# Patient Record
Sex: FEMALE | Race: WHITE | Employment: OTHER | URBAN - METROPOLITAN AREA
[De-identification: names, ages, dates, MRNs, and addresses within clinical notes are randomized per-mention and may not be internally consistent; named-entity substitution may affect disease eponyms.]

---

## 2023-04-17 ENCOUNTER — OFFICE VISIT (OUTPATIENT)
Dept: OBGYN | Age: 73
End: 2023-04-17
Payer: MEDICARE

## 2023-04-17 VITALS
SYSTOLIC BLOOD PRESSURE: 181 MMHG | WEIGHT: 116 LBS | BODY MASS INDEX: 19.81 KG/M2 | DIASTOLIC BLOOD PRESSURE: 65 MMHG | HEIGHT: 64 IN

## 2023-04-17 DIAGNOSIS — Z78.0 ENCOUNTER FOR OSTEOPOROSIS SCREENING IN ASYMPTOMATIC POSTMENOPAUSAL PATIENT: ICD-10-CM

## 2023-04-17 DIAGNOSIS — N89.8 VAGINAL ODOR: ICD-10-CM

## 2023-04-17 DIAGNOSIS — N74 FEMALE PELVIC INFLAMMATORY DISORDERS IN DISEASES CLASSIFIED ELSEWHERE: ICD-10-CM

## 2023-04-17 DIAGNOSIS — Z13.820 ENCOUNTER FOR OSTEOPOROSIS SCREENING IN ASYMPTOMATIC POSTMENOPAUSAL PATIENT: ICD-10-CM

## 2023-04-17 DIAGNOSIS — Z12.31 SCREENING MAMMOGRAM FOR BREAST CANCER: ICD-10-CM

## 2023-04-17 DIAGNOSIS — Z01.419 ENCOUNTER FOR ANNUAL ROUTINE GYNECOLOGICAL EXAMINATION: Primary | ICD-10-CM

## 2023-04-17 PROCEDURE — G8427 DOCREV CUR MEDS BY ELIG CLIN: HCPCS | Performed by: OBSTETRICS & GYNECOLOGY

## 2023-04-17 PROCEDURE — G0101 CA SCREEN;PELVIC/BREAST EXAM: HCPCS | Performed by: OBSTETRICS & GYNECOLOGY

## 2023-04-17 PROCEDURE — G8420 CALC BMI NORM PARAMETERS: HCPCS | Performed by: OBSTETRICS & GYNECOLOGY

## 2023-04-17 SDOH — ECONOMIC STABILITY: FOOD INSECURITY: WITHIN THE PAST 12 MONTHS, THE FOOD YOU BOUGHT JUST DIDN'T LAST AND YOU DIDN'T HAVE MONEY TO GET MORE.: NEVER TRUE

## 2023-04-17 SDOH — ECONOMIC STABILITY: FOOD INSECURITY: WITHIN THE PAST 12 MONTHS, YOU WORRIED THAT YOUR FOOD WOULD RUN OUT BEFORE YOU GOT MONEY TO BUY MORE.: NEVER TRUE

## 2023-04-17 SDOH — ECONOMIC STABILITY: HOUSING INSECURITY
IN THE LAST 12 MONTHS, WAS THERE A TIME WHEN YOU DID NOT HAVE A STEADY PLACE TO SLEEP OR SLEPT IN A SHELTER (INCLUDING NOW)?: NO

## 2023-04-17 SDOH — ECONOMIC STABILITY: INCOME INSECURITY: HOW HARD IS IT FOR YOU TO PAY FOR THE VERY BASICS LIKE FOOD, HOUSING, MEDICAL CARE, AND HEATING?: NOT HARD AT ALL

## 2023-04-17 ASSESSMENT — PATIENT HEALTH QUESTIONNAIRE - PHQ9
SUM OF ALL RESPONSES TO PHQ QUESTIONS 1-9: 0
1. LITTLE INTEREST OR PLEASURE IN DOING THINGS: 0
SUM OF ALL RESPONSES TO PHQ9 QUESTIONS 1 & 2: 0
2. FEELING DOWN, DEPRESSED OR HOPELESS: 0
SUM OF ALL RESPONSES TO PHQ QUESTIONS 1-9: 0

## 2023-04-17 ASSESSMENT — ENCOUNTER SYMPTOMS
EYES NEGATIVE: 1
RESPIRATORY NEGATIVE: 1
GASTROINTESTINAL NEGATIVE: 1
ALLERGIC/IMMUNOLOGIC NEGATIVE: 1

## 2023-04-17 NOTE — PROGRESS NOTES
4/17/23    Guanakito Rosales  1950    Chief Complaint   Patient presents with    Annual Exam     Pt here for annual exam. Postmenopausal. Total Hysterectomy. Last pap unsure. Pt not currently sexually active. Last mammo 2017- Birads 2. Last dexa 2014-normal. Pt c/o having a vaginal odor that she noticed end of November. Pt states she has been on a lot of antibiotics for her knee replacement and thinks she has a yeast infection. The patient is a 75 y.o. female, No obstetric history on file. who presents for her annual exam. She is menopausal.  She is not taking HRT. Lisa Hanna She reports no gynecological symptoms. She has had a hysterectomy with removal of ovaries. She is not sexually active. Pap smear history: Her last PAP smear was in ?? .  Her results were normal.    Breast history: her most recent mammogram was in 2017. The results were: Normal    Osteoporosis Status: her bone density scan was in 2014. The results were normal bone density    Colonoscopy Status: She has not had a colonoscopy in the past.    Past Medical History:   Diagnosis Date    Acid reflux     Allergic rhinitis     Arthritis     Endometriosis     Hyperlipidemia     Hypertension     Irregular heartbeat     Migraines     Mitral valve prolapse     Osteoarthritis        Past Surgical History:   Procedure Laterality Date    APPENDECTOMY      CARPAL TUNNEL RELEASE      COLONOSCOPY  11/16/2015    internal grade 1 hemorrhoids    CYST REMOVAL      CYST REMOVAL Right     hip    FOOT SURGERY      FOOT SURGERY Right     HYSTERECTOMY (CERVIX STATUS UNKNOWN)      JOINT REPLACEMENT Left 11/2022    NASAL SEPTUM SURGERY      TONSILLECTOMY      TUBAL LIGATION      VARICOSE VEIN SURGERY         Family History   Problem Relation Age of Onset    Cancer Father 80        colon cancer        Social History     Tobacco Use    Smoking status: Never    Smokeless tobacco: Never   Substance Use Topics    Alcohol use: Yes     Comment: occas    Drug use:  No

## 2023-04-18 LAB
CANDIDA DNA VAG QL NAA+PROBE: NORMAL
G VAGINALIS DNA SPEC QL NAA+PROBE: NORMAL
T VAGINALIS DNA VAG QL NAA+PROBE: NORMAL

## 2023-04-19 ENCOUNTER — TELEPHONE (OUTPATIENT)
Dept: GASTROENTEROLOGY | Age: 73
End: 2023-04-19

## 2023-04-19 NOTE — TELEPHONE ENCOUNTER
Called pt. In regards to a referral for a colon screening. Procedure scheduled for 5/30/23 juan Vann.

## 2023-05-26 ENCOUNTER — ANESTHESIA EVENT (OUTPATIENT)
Dept: OPERATING ROOM | Age: 73
End: 2023-05-26
Payer: MEDICARE

## 2023-05-29 NOTE — H&P
Original H &P in soft chart. I have examined the patient immediately before the procedure and there is no change in the previous history and physical exam, which has been reviewed. There is no history of sleep apnea, snoring, or stridor. There has been no  previous adverse experience with sedation/anesthesia. There is no increased risk for aspiration of gastric contents. The patient has been instructed that all resuscitative measures (during the operative and immediate perioperative period) will be instituted in the unlikely event that they will be needed. The patient has no pertinent past surgical or family history other than listed in the original H&P. The patient was counseled about the risks of anurag Covid-19 during their perioperative period and any recovery window from their procedure. The patient was made aware that anurag Covid-19  may worsen their prognosis for recovering from their procedure  and lend to a higher morbidity and/or mortality risk. All material risks, benefits, and reasonable alternatives including postponing the procedure were discussed. The patient does wish to proceed with the procedure at this time.     ASA Class: 2  AIRWAY Class: 1

## 2023-05-30 ENCOUNTER — HOSPITAL ENCOUNTER (OUTPATIENT)
Age: 73
Setting detail: OUTPATIENT SURGERY
Discharge: HOME | End: 2023-05-30
Attending: SPECIALIST | Admitting: SPECIALIST
Payer: MEDICARE

## 2023-05-30 ENCOUNTER — ANESTHESIA (OUTPATIENT)
Dept: OPERATING ROOM | Age: 73
End: 2023-05-30
Payer: MEDICARE

## 2023-05-30 VITALS
OXYGEN SATURATION: 95 % | BODY MASS INDEX: 20.8 KG/M2 | HEART RATE: 61 BPM | DIASTOLIC BLOOD PRESSURE: 57 MMHG | SYSTOLIC BLOOD PRESSURE: 163 MMHG | WEIGHT: 113 LBS | RESPIRATION RATE: 16 BRPM | HEIGHT: 62 IN | TEMPERATURE: 97.4 F

## 2023-05-30 PROBLEM — Z12.11 COLON CANCER SCREENING: Status: ACTIVE | Noted: 2023-05-30

## 2023-05-30 PROCEDURE — 2709999900 HC NON-CHARGEABLE SUPPLY: Performed by: SPECIALIST

## 2023-05-30 PROCEDURE — 7100000011 HC PHASE II RECOVERY - ADDTL 15 MIN: Performed by: SPECIALIST

## 2023-05-30 PROCEDURE — 3700000001 HC ADD 15 MINUTES (ANESTHESIA): Performed by: SPECIALIST

## 2023-05-30 PROCEDURE — 3609027000 HC COLONOSCOPY: Performed by: SPECIALIST

## 2023-05-30 PROCEDURE — 6360000002 HC RX W HCPCS

## 2023-05-30 PROCEDURE — 7100000010 HC PHASE II RECOVERY - FIRST 15 MIN: Performed by: SPECIALIST

## 2023-05-30 PROCEDURE — G0121 COLON CA SCRN NOT HI RSK IND: HCPCS | Performed by: SPECIALIST

## 2023-05-30 PROCEDURE — 2580000003 HC RX 258: Performed by: ANESTHESIOLOGY

## 2023-05-30 PROCEDURE — 3700000000 HC ANESTHESIA ATTENDED CARE: Performed by: SPECIALIST

## 2023-05-30 RX ORDER — SODIUM CHLORIDE, SODIUM LACTATE, POTASSIUM CHLORIDE, CALCIUM CHLORIDE 600; 310; 30; 20 MG/100ML; MG/100ML; MG/100ML; MG/100ML
INJECTION, SOLUTION INTRAVENOUS CONTINUOUS
Status: DISCONTINUED | OUTPATIENT
Start: 2023-05-30 | End: 2023-05-30 | Stop reason: HOSPADM

## 2023-05-30 RX ORDER — FERROUS SULFATE 325(65) MG
325 TABLET ORAL
COMMUNITY

## 2023-05-30 RX ORDER — LIDOCAINE HYDROCHLORIDE 20 MG/ML
INJECTION, SOLUTION INTRAVENOUS PRN
Status: DISCONTINUED | OUTPATIENT
Start: 2023-05-30 | End: 2023-05-30 | Stop reason: SDUPTHER

## 2023-05-30 RX ORDER — PROPOFOL 10 MG/ML
INJECTION, EMULSION INTRAVENOUS PRN
Status: DISCONTINUED | OUTPATIENT
Start: 2023-05-30 | End: 2023-05-30 | Stop reason: SDUPTHER

## 2023-05-30 RX ORDER — SODIUM CHLORIDE, SODIUM LACTATE, POTASSIUM CHLORIDE, CALCIUM CHLORIDE 600; 310; 30; 20 MG/100ML; MG/100ML; MG/100ML; MG/100ML
INJECTION, SOLUTION INTRAVENOUS CONTINUOUS PRN
Status: DISCONTINUED | OUTPATIENT
Start: 2023-05-30 | End: 2023-05-30 | Stop reason: SDUPTHER

## 2023-05-30 RX ADMIN — SODIUM CHLORIDE, POTASSIUM CHLORIDE, SODIUM LACTATE AND CALCIUM CHLORIDE: 600; 310; 30; 20 INJECTION, SOLUTION INTRAVENOUS at 09:46

## 2023-05-30 RX ADMIN — PROPOFOL 250 MG: 10 INJECTION, EMULSION INTRAVENOUS at 10:45

## 2023-05-30 RX ADMIN — SODIUM CHLORIDE, SODIUM LACTATE, POTASSIUM CHLORIDE, CALCIUM CHLORIDE: 600; 310; 30; 20 INJECTION, SOLUTION INTRAVENOUS at 10:45

## 2023-05-30 RX ADMIN — LIDOCAINE HYDROCHLORIDE 100 MG: 20 INJECTION, SOLUTION INTRAVENOUS at 10:45

## 2023-05-30 ASSESSMENT — PAIN SCALES - GENERAL
PAINLEVEL_OUTOF10: 0
PAINLEVEL_OUTOF10: 0

## 2023-05-30 ASSESSMENT — PAIN - FUNCTIONAL ASSESSMENT: PAIN_FUNCTIONAL_ASSESSMENT: 0-10

## 2023-05-30 NOTE — DISCHARGE INSTRUCTIONS
[facility]  [phone]    Do not drive, work around [address] [place], [place] [address] St or use equipment. Do not drink any alcoholic beverages. Do not smoke while alone. Avoid making important decisions. Plan to spend a quiet, relaxed evening @ home. Resume normal activities as you begin to feel better. Eat lightly for your first meal, then gradually increase your diet to what is normal for you. In case of nausea, avoid food and drink only clear liquids. Resume food as nausea ceases. Notify your surgeon if you experience fever, chills, large amount of bleeding, difficulty breathing, persistent nausea and vomiting or any other disturbing problem. Call for a follow-up appointment with your surgeon. EGD/COLONOSCOPY    __[place]_______________________________    OFFICE [facility]__[phone]_________________     FOLLOW UP APPOINTMENT  AS NEEDED. CALL DR. Declan Crespo office in one to get biopsy results. REPEAT EGD PROCEDURE  AS NEEDED. Repeat Colonoscopy screening or surveillance colonoscopy is not needed. TEST ORDERED: NONE       What to Expect at [place] [address] Recovery:EGD  The only discomfort after your EGD is generally limited to a mild soreness of the throat, which may last a day or two. Call your physician immediately if you have severe chest pain, shortness of breath or a temperature of 100 degrees or higher if taken orally. Your Recovery: COLON   Your doctor will tell you when you can eat and do your other usual activitiesYour doctor will talk to you about when you will need your next colonoscopy. Your doctor can help you decide how often you need to be checked. This will depend on the results of your test and your risk for colorectal cancer. After the test, you may be bloated or have gas pains. You may need to pass gas. If a biopsy was done or a polyp was removed, you may have streaks of blood in your stool (feces) for a few days.   This care sheet gives you a general idea about

## 2023-05-30 NOTE — PROGRESS NOTES
Patient returned to room from Endoscopy. Report received bedside from [facility]. Bed brakes applied and VS obtained. Tele monitor on. See flow sheets. Patient A/O x4. Drink and snack offered. Will continue to monitor. Call light in reach. Family member at bedside.

## 2023-05-30 NOTE — PROGRESS NOTES
200 Pt received from [facility] and report received from [facility]. Pt denies c/o.  at bedside. Call light in reach. Pt given beverage. 1130 in to check on pt. Pt denies c/o or needs. Call light in reach. 1155 Discharge instructions given to pt and . Both verbalized understanding of instructions. Pt up to side of bed. Pt tolerated well and ready to get dressed to go home. Call light in reach. Pt denies c/o. 1200 Pt discharged to home to husbands awaiting vehicle to drive pt home.

## 2023-05-30 NOTE — ANESTHESIA POSTPROCEDURE EVALUATION
Department of Anesthesiology  Postprocedure Note    Patient: Luly Lozano  MRN: [id number]  YOB: 1950  Date of evaluation: 5/30/2023      Procedure Summary     Date: 05/30/23 Room / Location: [address]    Anesthesia Start: 0442 Anesthesia Stop: 1106    Procedure: COLORECTAL CANCER SCREENING, NOT HIGH RISK Diagnosis:       Screen for colon cancer      Family history of colon cancer      (Screen for colon cancer [Z12.11])      (Family history of colon cancer [Z80.0])    Surgeons: Mark Doss MD Responsible Provider: HARSH High CRNA    Anesthesia Type: MAC ASA Status: 2          Anesthesia Type: MAC    Jorje Phase I:      Jorje Phase II:        Anesthesia Post Evaluation    Patient location during evaluation: bedside  Patient participation: complete - patient participated  Level of consciousness: awake  Pain score: 0  Airway patency: patent  Nausea & Vomiting: no vomiting and no nausea  Complications: no  Cardiovascular status: hemodynamically stable  Respiratory status: acceptable, airway suctioned, spontaneous ventilation and room air  Hydration status: stable

## 2023-06-29 PROBLEM — Z12.11 COLON CANCER SCREENING: Status: RESOLVED | Noted: 2023-05-30 | Resolved: 2023-06-29

## 2023-11-02 ENCOUNTER — TELEPHONE (OUTPATIENT)
Dept: FAMILY MEDICINE CLINIC | Age: 73
End: 2023-11-02

## 2023-11-02 NOTE — TELEPHONE ENCOUNTER
Called patient to schedule a 6 mth F/U with Dr Francis Scanlon. which would be  March 2024. Patient will call back to schedule.

## 2023-11-29 ENCOUNTER — TELEPHONE (OUTPATIENT)
Dept: FAMILY MEDICINE CLINIC | Age: 73
End: 2023-11-29

## 2023-11-29 NOTE — TELEPHONE ENCOUNTER
PT IS IN [place] AT HER DAUGHTER'S AND SHE HAS COVID -TESTED POS 11/27 SX STARTED 11/27 ALSO-HEAD CONGESTION, NAUSEOUS, STUFFY NOSE AND SORE THROAT.  CAN BRIE BE CALLED IN FOR HER? PLEASE LET Maida

## 2024-03-11 ENCOUNTER — OFFICE VISIT (OUTPATIENT)
Dept: FAMILY MEDICINE CLINIC | Age: 74
End: 2024-03-11
Payer: MEDICARE

## 2024-03-11 VITALS
OXYGEN SATURATION: 98 % | BODY MASS INDEX: 20.8 KG/M2 | DIASTOLIC BLOOD PRESSURE: 68 MMHG | HEIGHT: 62 IN | HEART RATE: 62 BPM | SYSTOLIC BLOOD PRESSURE: 134 MMHG | WEIGHT: 113 LBS

## 2024-03-11 DIAGNOSIS — F34.1 DYSTHYMIA: ICD-10-CM

## 2024-03-11 DIAGNOSIS — E78.49 OTHER HYPERLIPIDEMIA: ICD-10-CM

## 2024-03-11 DIAGNOSIS — K21.9 GASTROESOPHAGEAL REFLUX DISEASE WITHOUT ESOPHAGITIS: ICD-10-CM

## 2024-03-11 DIAGNOSIS — J30.1 SEASONAL ALLERGIC RHINITIS DUE TO POLLEN: ICD-10-CM

## 2024-03-11 DIAGNOSIS — I10 PRIMARY HYPERTENSION: Primary | ICD-10-CM

## 2024-03-11 PROCEDURE — 3075F SYST BP GE 130 - 139MM HG: CPT | Performed by: INTERNAL MEDICINE

## 2024-03-11 PROCEDURE — 1036F TOBACCO NON-USER: CPT | Performed by: INTERNAL MEDICINE

## 2024-03-11 PROCEDURE — 1123F ACP DISCUSS/DSCN MKR DOCD: CPT | Performed by: INTERNAL MEDICINE

## 2024-03-11 PROCEDURE — G8420 CALC BMI NORM PARAMETERS: HCPCS | Performed by: INTERNAL MEDICINE

## 2024-03-11 PROCEDURE — 3017F COLORECTAL CA SCREEN DOC REV: CPT | Performed by: INTERNAL MEDICINE

## 2024-03-11 PROCEDURE — 3078F DIAST BP <80 MM HG: CPT | Performed by: INTERNAL MEDICINE

## 2024-03-11 PROCEDURE — 99214 OFFICE O/P EST MOD 30 MIN: CPT | Performed by: INTERNAL MEDICINE

## 2024-03-11 PROCEDURE — G8427 DOCREV CUR MEDS BY ELIG CLIN: HCPCS | Performed by: INTERNAL MEDICINE

## 2024-03-11 PROCEDURE — G8399 PT W/DXA RESULTS DOCUMENT: HCPCS | Performed by: INTERNAL MEDICINE

## 2024-03-11 PROCEDURE — G8484 FLU IMMUNIZE NO ADMIN: HCPCS | Performed by: INTERNAL MEDICINE

## 2024-03-11 PROCEDURE — 1090F PRES/ABSN URINE INCON ASSESS: CPT | Performed by: INTERNAL MEDICINE

## 2024-03-11 RX ORDER — ESCITALOPRAM OXALATE 10 MG/1
10 TABLET ORAL DAILY
Qty: 90 TABLET | Refills: 1 | Status: SHIPPED | OUTPATIENT
Start: 2024-03-11

## 2024-03-11 RX ORDER — SPIRONOLACTONE 25 MG/1
25 TABLET ORAL EVERY OTHER DAY
Qty: 45 TABLET | Refills: 1 | Status: SHIPPED | OUTPATIENT
Start: 2024-03-11

## 2024-03-11 RX ORDER — DIGOXIN 250 MCG
250 TABLET ORAL DAILY
Qty: 90 TABLET | Refills: 1 | Status: SHIPPED | OUTPATIENT
Start: 2024-03-11

## 2024-03-11 RX ORDER — TRIAMTERENE AND HYDROCHLOROTHIAZIDE 37.5; 25 MG/1; MG/1
1 CAPSULE ORAL DAILY
Qty: 90 CAPSULE | Refills: 1 | Status: SHIPPED | OUTPATIENT
Start: 2024-03-11

## 2024-03-11 RX ORDER — BISOPROLOL FUMARATE 5 MG/1
5 TABLET, FILM COATED ORAL DAILY
Qty: 90 TABLET | Refills: 1 | Status: SHIPPED | OUTPATIENT
Start: 2024-03-11

## 2024-03-11 RX ORDER — LANSOPRAZOLE 30 MG/1
30 CAPSULE, DELAYED RELEASE ORAL DAILY
Qty: 90 CAPSULE | Refills: 1 | Status: SHIPPED | OUTPATIENT
Start: 2024-03-11

## 2024-03-11 RX ORDER — BUPROPION HYDROCHLORIDE 150 MG/1
150 TABLET, EXTENDED RELEASE ORAL 2 TIMES DAILY
Qty: 180 TABLET | Refills: 1 | Status: SHIPPED | OUTPATIENT
Start: 2024-03-11

## 2024-03-11 RX ORDER — MONTELUKAST SODIUM 10 MG/1
10 TABLET ORAL NIGHTLY
Qty: 90 TABLET | Refills: 1 | Status: SHIPPED | OUTPATIENT
Start: 2024-03-11

## 2024-03-11 RX ORDER — POTASSIUM CHLORIDE 750 MG/1
10 TABLET, FILM COATED, EXTENDED RELEASE ORAL DAILY
Qty: 90 TABLET | Refills: 1 | Status: SHIPPED | OUTPATIENT
Start: 2024-03-11

## 2024-03-11 RX ORDER — ATORVASTATIN CALCIUM 10 MG/1
10 TABLET, FILM COATED ORAL DAILY
Qty: 90 TABLET | Refills: 1 | Status: SHIPPED | OUTPATIENT
Start: 2024-03-11

## 2024-03-11 ASSESSMENT — PATIENT HEALTH QUESTIONNAIRE - PHQ9
2. FEELING DOWN, DEPRESSED OR HOPELESS: 0
SUM OF ALL RESPONSES TO PHQ QUESTIONS 1-9: 0
1. LITTLE INTEREST OR PLEASURE IN DOING THINGS: 0
SUM OF ALL RESPONSES TO PHQ QUESTIONS 1-9: 0
SUM OF ALL RESPONSES TO PHQ9 QUESTIONS 1 & 2: 0

## 2024-03-11 NOTE — PROGRESS NOTES
Outpatient Clinic Visit Note    Patient: Britt Lynne  : 1950 (74 y.o.)  Date: 3/11/2024    CC:  Chief Complaint   Patient presents with    6 Month Follow-Up        HPI: she is feeling much better since she had her knee replaced, in 2022.  She is more active.  She denies chest pain or shortness of breath.     Past Medical History:    Past Medical History:   Diagnosis Date    Acid reflux     Allergic rhinitis     Arthritis     Endometriosis     Hyperlipidemia     Hypertension     Irregular heartbeat     Migraines     Mitral valve prolapse     Osteoarthritis        Past Surgical History:  Past Surgical History:   Procedure Laterality Date    APPENDECTOMY      CARPAL TUNNEL RELEASE Right     COLONOSCOPY  2015    internal grade 1 hemorrhoids    COLONOSCOPY N/A 2023    COLORECTAL CANCER SCREENING, NOT HIGH RISK performed by Mateus Vann MD at [facility] OR    CYST REMOVAL N/A     right side    FOOT SURGERY Right     FOOT SURGERY Right     HYSTERECTOMY (CERVIX STATUS UNKNOWN)      JOINT REPLACEMENT Left 2022    knee    NASAL SEPTUM SURGERY      TONSILLECTOMY      TUBAL LIGATION      VARICOSE VEIN SURGERY Bilateral        Home Medications:  Current Outpatient Medications   Medication Sig Dispense Refill    ferrous sulfate (IRON 325) 325 (65 Fe) MG tablet Take 1 tablet by mouth daily (with breakfast)      atorvastatin (LIPITOR) 10 MG tablet       bisoprolol (ZEBETA) 5 MG tablet       escitalopram (LEXAPRO) 10 MG tablet       lansoprazole (PREVACID) 30 MG delayed release capsule       digoxin (LANOXIN) 250 MCG tablet Take 1 tablet by mouth daily      methotrexate (RHEUMATREX) 2.5 MG chemo tablet       montelukast (SINGULAIR) 10 MG tablet       potassium chloride (K-DUR) 10 MEQ tablet       triamterene-hydrochlorothiazide (DYAZIDE) 37.5-25 MG per capsule       buPROPion SR (WELLBUTRIN SR) 150 MG SR tablet       spironolactone (ALDACTONE) 25 MG tablet every other day       folic acid

## 2024-06-11 ENCOUNTER — TELEPHONE (OUTPATIENT)
Dept: FAMILY MEDICINE CLINIC | Age: 74
End: 2024-06-11

## 2024-06-11 NOTE — TELEPHONE ENCOUNTER
SON CALLING TO LET US KNOW HIS MOM PASSED AWAY 6/7 AT [facility]. SX WITH SAGE AND HAD 2 SANABRIA ALL LAST WEEK  AND JUST COULDN'T GET BACK FROM THAT.

## (undated) DEVICE — ENDOSCOPY KIT: Brand: MEDLINE INDUSTRIES, INC.